# Patient Record
(demographics unavailable — no encounter records)

---

## 2025-01-16 NOTE — HISTORY OF PRESENT ILLNESS
[de-identified] : 63 y.o. male, PMHx includes diabetes, hypertension, hyperlipidemia, CAD (S/p cardiac cath on 11/25/2022 with stents to proximal and mid RCA), erectile dysfunction, prostatitis, hemorrhoids. Presents for f/u on  issues

## 2025-01-16 NOTE — ASSESSMENT
[FreeTextEntry1] : DM: AIC 6.4 6/2024 improved from 6.7 2023 exercising regularly 30 min daily  and eating well Ophthalmology yearly 6/2024  Normal foot exam Pneumovax 2021 Remains on statin  Hypertension: continues amlodipine, chlorthalidone will check at home, discussed goal <130/80  Hyperlipidemia: LDL-63 6/24 eating well, exercising check lipids today, fasting Continues statin and fibrate  ED: sildenafil as needed continue urology follow up PSA 6/2024 nl  CAD: S/p stent x 2 11/2022  ECHO 12/2023 - EF 55 - 60 % mild diastolic LV dysf  Denies chest or dyspnea BP well controlled Tolerating statin and fibrate - check lipids today continues ASA daily Cardio f/u q 6 mo.  HM: Skin screening with derm Pneumovax 23- 8/2020  tdap 2020 UTD colonoscopy, repeat 2028 UTD dental labs today Shingrix encouraged, declines for now COVID vaccine completed.

## 2025-01-28 NOTE — PLAN
[FreeTextEntry1] : 1. Prediabetes -stable A1C of 6.4. Repeat lab today.  -Continues to exercise & diet control   2. Lipid panel- WNL in June  -Continues Atorvastatin & Fenofibrate  -Lab repeated today.

## 2025-01-28 NOTE — PHYSICAL EXAM
[No Acute Distress] : no acute distress [Clear to Auscultation] : lungs were clear to auscultation bilaterally [Normal] : normal rate, regular rhythm, normal S1 and S2 and no murmur heard

## 2025-01-28 NOTE — HISTORY OF PRESENT ILLNESS
[FreeTextEntry1] : f/u blood work  [de-identified] : 63 year old male with PMH of diabetes, hypertension, hyperlipidemia, CAD (S/p cardiac cath on 11/25/2022 with stents to proximal and mid RCA), erectile dysfunction, prostatitis presents for follow up  Was seen by PCP in June 2024 for CPE  A1C 6.4% at the time- would like repeated today  Lipid panel -WNL on Atorvastatin & Fenofibrate.  6lb weight gain since last visit- continues exercising and diet modifications.

## 2025-06-11 NOTE — HISTORY OF PRESENT ILLNESS
[FreeTextEntry1] : Stevan Ardonpearl is a 64-year-old male with CAD, hypertension and hypercholesterolemia comes for follow up visit. S/p cardiac Cath on 11/25/2022 and s/p stents to proximal and mid RCA.  Denies any chest pain or palpitations. Mild   shortness of breath on exertion especially climbing stairs which is relieved with rest in few minutes. Compliant to medications and diet.

## 2025-06-11 NOTE — DISCUSSION/SUMMARY
[FreeTextEntry1] : In a summary Stevan Ardonpearl is a middle- aged male with CAD s/p stents to RCA, continue Aspirin 81 mg once daily.  Hypertension, controlled. Continue Amlodipine, Chlorthalidone and 2 gm sodium diet. Hypercholesterolemia, continue Fenofibrate, Atorvastatin and low cholesterol diet. LDL goal less than Shortness of breath on exerN AND cad, WILL GET eCHO TO ASSESS lv SYSTOLIC FUNCTION. nUCLEAR STRESS TEST TO RULE OUT ISCHEMIA AND EVALUATE STENTS. fURTHER PLAN BASED ON TEST RESULTS.  Healthy lifestyle. Follow up in 6 months.

## 2025-06-11 NOTE — PHYSICAL EXAM
[Normal Conjunctiva] : normal conjunctiva [No Carotid Bruit] : no carotid bruit [Soft] : abdomen soft [Non Tender] : non-tender [Normal Bowel Sounds] : normal bowel sounds [No Edema] : no edema [No Cyanosis] : no cyanosis [No Varicosities] : no varicosities [Normal] : alert and oriented, normal memory